# Patient Record
Sex: FEMALE | Race: BLACK OR AFRICAN AMERICAN | URBAN - METROPOLITAN AREA
[De-identification: names, ages, dates, MRNs, and addresses within clinical notes are randomized per-mention and may not be internally consistent; named-entity substitution may affect disease eponyms.]

---
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
## 2018-01-25 ENCOUNTER — ED HISTORICAL/CONVERTED ENCOUNTER (OUTPATIENT)
Dept: OTHER | Age: 18
End: 2018-01-25

## 2018-02-02 ENCOUNTER — HOSPITAL ENCOUNTER (OUTPATIENT)
Dept: HOSPITAL 14 - H.OPSURG | Age: 18
Discharge: HOME | End: 2018-02-02
Attending: SPECIALIST
Payer: COMMERCIAL

## 2018-02-02 VITALS — OXYGEN SATURATION: 99 % | SYSTOLIC BLOOD PRESSURE: 114 MMHG | HEART RATE: 96 BPM | DIASTOLIC BLOOD PRESSURE: 65 MMHG

## 2018-02-02 VITALS — TEMPERATURE: 98 F

## 2018-02-02 VITALS — BODY MASS INDEX: 20.5 KG/M2

## 2018-02-02 VITALS — RESPIRATION RATE: 18 BRPM

## 2018-02-02 DIAGNOSIS — N83.202: Primary | ICD-10-CM

## 2018-02-02 DIAGNOSIS — I10: ICD-10-CM

## 2018-02-02 LAB
ERYTHROCYTE [DISTWIDTH] IN BLOOD BY AUTOMATED COUNT: 18.5 % (ref 11.5–14.5)
HGB BLD-MCNC: 11.4 G/DL (ref 12–16)
MCH RBC QN AUTO: 26.4 PG (ref 27–31)
MCHC RBC AUTO-ENTMCNC: 32.9 G/DL (ref 33–37)
MCV RBC AUTO: 80.2 FL (ref 81–99)
PLATELET # BLD: 349 K/UL (ref 130–400)
RBC # BLD AUTO: 4.31 MIL/UL (ref 3.8–5.2)
WBC # BLD AUTO: 6.2 K/UL (ref 4.8–10.8)

## 2018-02-02 PROCEDURE — 86900 BLOOD TYPING SEROLOGIC ABO: CPT

## 2018-02-02 PROCEDURE — 86850 RBC ANTIBODY SCREEN: CPT

## 2018-02-02 PROCEDURE — 36415 COLL VENOUS BLD VENIPUNCTURE: CPT

## 2018-02-02 PROCEDURE — 85027 COMPLETE CBC AUTOMATED: CPT

## 2018-02-02 PROCEDURE — 86870 RBC ANTIBODY IDENTIFICATION: CPT

## 2018-02-02 PROCEDURE — 58925 REMOVAL OF OVARIAN CYST(S): CPT

## 2018-02-02 RX ADMIN — HYDROMORPHONE HYDROCHLORIDE PRN MG: 1 INJECTION, SOLUTION INTRAMUSCULAR; INTRAVENOUS; SUBCUTANEOUS at 10:29

## 2018-02-02 RX ADMIN — HYDROMORPHONE HYDROCHLORIDE PRN MG: 1 INJECTION, SOLUTION INTRAMUSCULAR; INTRAVENOUS; SUBCUTANEOUS at 10:14

## 2018-02-02 RX ADMIN — HYDROMORPHONE HYDROCHLORIDE PRN MG: 1 INJECTION, SOLUTION INTRAMUSCULAR; INTRAVENOUS; SUBCUTANEOUS at 09:40

## 2018-02-02 RX ADMIN — HYDROMORPHONE HYDROCHLORIDE PRN MG: 1 INJECTION, SOLUTION INTRAMUSCULAR; INTRAVENOUS; SUBCUTANEOUS at 10:43

## 2018-02-02 RX ADMIN — HYDROMORPHONE HYDROCHLORIDE PRN MG: 1 INJECTION, SOLUTION INTRAMUSCULAR; INTRAVENOUS; SUBCUTANEOUS at 09:59

## 2018-02-08 NOTE — OP
PROCEDURE DATE:  02/02/2018



PREOPERATIVE DIAGNOSES:  Pelvic pain and left adnexal mass by sonogram.



POSTOPERATIVE DIAGNOSES:  Pelvic pain and left adnexal mass by sonogram.



PROCEDURE:  Lysis of small bilateral simple cyst in the ovaries.



SURGEON:  Berhane Adam MD



ANESTHESIA ADMINISTERED BY:  Edilberto Nuñez MD



ASSISTANT:  Jan Ortiz MD



FINDINGS:  Small simple cyst bilaterally on both ovaries.  No large mass

was found in the adnexa.



DESCRIPTION OF PROCEDURE:  With the patient in the dorsal lithotomy

position under general anesthesia, the patient was prepped and draped in

the usual sterile manner.  After this was done, the bladder was emptied. 

After this was done, I moved to the abdomen where a small incision was made

below the umbilicus and a Veress needle introduced, inflating the abdomen

to about 4 L of CO2.  Following this, incision enlarged to about 1 cm and

laparoscope was then introduced after using the trocar.  The pelvic cavity

was checked completely and all that was found was some small simple cyst

bilaterally in the left and right ovary.  They were lysed and drained and

clear fluid was found.  No specimen was obtained.  Following this, the

pelvic cavity was irrigated until clean and the instruments were removed

from the abdominopelvic cavity.  The incision was then closed with 2-0

Vicryl and Dermabond.  The patient was in satisfactory condition on the way

to recovery room.  Dr. Ortiz was present from the beginning of the

surgery until the end of surgery.  Blood loss was minimal.





__________________________________________

Berhane Adam MD





DD:  02/07/2018 17:19:15

DT:  02/07/2018 19:32:09

Job # [id number]